# Patient Record
Sex: FEMALE | Race: WHITE | NOT HISPANIC OR LATINO | URBAN - METROPOLITAN AREA
[De-identification: names, ages, dates, MRNs, and addresses within clinical notes are randomized per-mention and may not be internally consistent; named-entity substitution may affect disease eponyms.]

---

## 2017-03-13 ENCOUNTER — TELEPHONE (OUTPATIENT)
Dept: GASTROENTEROLOGY | Facility: CLINIC | Age: 52
End: 2017-03-13

## 2017-03-13 ENCOUNTER — TELEPHONE (OUTPATIENT)
Dept: GASTROENTEROLOGY | Facility: HOSPITAL | Age: 52
End: 2017-03-13

## 2017-03-13 DIAGNOSIS — K57.32 DIVERTICULITIS OF LARGE INTESTINE WITHOUT PERFORATION OR ABSCESS WITHOUT BLEEDING: Primary | ICD-10-CM

## 2017-03-13 RX ORDER — AMOXICILLIN AND CLAVULANATE POTASSIUM 875; 125 MG/1; MG/1
1 TABLET, FILM COATED ORAL 2 TIMES DAILY
Qty: 20 TABLET | Refills: 0 | Status: SHIPPED | OUTPATIENT
Start: 2017-03-13 | End: 2017-03-23

## 2017-03-13 NOTE — TELEPHONE ENCOUNTER
----- Message from Fred Lara MD sent at 3/13/2017 11:20 AM CDT -----  Contact: Self- [phone]  i called  ----- Message -----     From: Milagros Lopez MA     Sent: 3/13/2017  10:29 AM       To: Fred Lara MD        ----- Message -----     From: Janeth Person     Sent: 3/13/2017  10:17 AM       To: Marco Lara- pt called stating she is having severe abdominal pain on her left side- pain at level 8- says last time this occurred Dr. Lara ordered blood work for her and it turned out to be a n infection- wanted to know if he could order more blood work- please call pt back at [phone]

## 2017-03-13 NOTE — TELEPHONE ENCOUNTER
----- Message from Fred Lara MD sent at 3/13/2017  2:21 PM CDT -----  Contact: self [phone]  There is nothing she can do until the pain meds kick in  It sounds like she does have pain meds  Bed rest, maybe only liquids  ----- Message -----     From: Milagros Lopez MA     Sent: 3/13/2017   2:04 PM       To: Fred Lara MD        ----- Message -----     From: Damaris Cota     Sent: 3/13/2017   2:02 PM       To: Marco Lara - what can she take until her pain meds kick in - please call patient at [phone]

## 2025-02-20 ENCOUNTER — HOSPITAL ENCOUNTER (OUTPATIENT)
Dept: RADIOLOGY | Facility: OTHER | Age: 60
Discharge: HOME | End: 2025-02-20
Attending: ORTHOPAEDIC SURGERY
Payer: COMMERCIAL

## 2025-02-20 DIAGNOSIS — M51.369 OTHER INTERVERTEBRAL DISC DEGENERATION OF LUMBAR REGION WITHOUT LUMBAR BACK PAIN OR LOWER EXTREMITY PAIN: ICD-10-CM

## 2025-02-20 PROCEDURE — 72148 MRI LUMBAR SPINE W/O DYE: CPT | Mod: TC
